# Patient Record
Sex: MALE | Race: WHITE | NOT HISPANIC OR LATINO | Employment: OTHER | URBAN - METROPOLITAN AREA
[De-identification: names, ages, dates, MRNs, and addresses within clinical notes are randomized per-mention and may not be internally consistent; named-entity substitution may affect disease eponyms.]

---

## 2018-05-03 ENCOUNTER — NEW PATIENT (OUTPATIENT)
Dept: URBAN - METROPOLITAN AREA CLINIC 39 | Facility: CLINIC | Age: 78
End: 2018-05-03

## 2018-05-03 DIAGNOSIS — D49.2: ICD-10-CM

## 2018-05-03 PROCEDURE — G8428 CUR MEDS NOT DOCUMENT: HCPCS

## 2018-05-03 PROCEDURE — 99202 OFFICE O/P NEW SF 15 MIN: CPT

## 2018-05-03 PROCEDURE — 4040F PNEUMOC VAC/ADMIN/RCVD: CPT

## 2018-05-03 PROCEDURE — 1036F TOBACCO NON-USER: CPT

## 2018-05-03 PROCEDURE — 67810 INCAL BX EYELID SKN LID MRGN: CPT

## 2018-05-03 PROCEDURE — G8484 FLU IMMUNIZE NO ADMIN: HCPCS

## 2018-05-03 ASSESSMENT — VISUAL ACUITY
OD_CC: 20/80
OS_CC: 20/60

## 2018-05-11 ENCOUNTER — CATARACT CONSULT (OUTPATIENT)
Dept: URBAN - METROPOLITAN AREA CLINIC 39 | Facility: CLINIC | Age: 78
End: 2018-05-11

## 2018-05-11 VITALS
DIASTOLIC BLOOD PRESSURE: 74 MMHG | HEIGHT: 60 IN | SYSTOLIC BLOOD PRESSURE: 126 MMHG | HEART RATE: 76 BPM | RESPIRATION RATE: 16 BRPM

## 2018-05-11 DIAGNOSIS — H18.51: ICD-10-CM

## 2018-05-11 DIAGNOSIS — Z79.899: ICD-10-CM

## 2018-05-11 DIAGNOSIS — H35.371: ICD-10-CM

## 2018-05-11 DIAGNOSIS — H25.812: ICD-10-CM

## 2018-05-11 DIAGNOSIS — H25.811: ICD-10-CM

## 2018-05-11 PROCEDURE — 92286 ANT SGM IMG I&R SPECLR MIC: CPT

## 2018-05-11 PROCEDURE — G9744 PT NOT ELI D/T ACT DIG HTN: HCPCS

## 2018-05-11 PROCEDURE — G8754 DIAS BP LESS 90: HCPCS

## 2018-05-11 PROCEDURE — G8752 SYS BP LESS 140: HCPCS

## 2018-05-11 PROCEDURE — 92134 CPTRZ OPH DX IMG PST SGM RTA: CPT

## 2018-05-11 PROCEDURE — 92136TC INTERFEROMETRY - TECHNICAL COMPONENT

## 2018-05-11 PROCEDURE — 4040F PNEUMOC VAC/ADMIN/RCVD: CPT

## 2018-05-11 PROCEDURE — 99215 OFFICE O/P EST HI 40 MIN: CPT

## 2018-05-11 PROCEDURE — 92025-1 CORNEAL TOPOGRAPHY, INS

## 2018-05-11 PROCEDURE — G8427 DOCREV CUR MEDS BY ELIG CLIN: HCPCS

## 2018-05-11 PROCEDURE — 1036F TOBACCO NON-USER: CPT

## 2018-05-11 RX ORDER — MOXIFLOXACIN HYDROCHLORIDE 5 MG/ML: 1 SOLUTION/ DROPS OPHTHALMIC

## 2018-05-11 RX ORDER — PREDNISOLONE ACETATE 10 MG/ML: 1 SUSPENSION/ DROPS OPHTHALMIC

## 2018-05-11 RX ORDER — NEPAFENAC 3 MG/ML: 1 SUSPENSION/ DROPS OPHTHALMIC ONCE A DAY

## 2018-05-11 ASSESSMENT — TONOMETRY
OD_IOP_MMHG: 13
OS_IOP_MMHG: 13

## 2018-05-11 ASSESSMENT — VISUAL ACUITY
OD_CC: J6
OS_CC: J3
OS_BAT: 20/70
OD_PAM: 20/60-1
OS_AM: 20/25+2
OD_SC: <J12
OS_SC: J12
OS_CC: 20/30-2
OD_SC: 20/400
OD_BAT: <20/400
OD_CC: 20/70
OS_SC: 20/40+1

## 2018-05-15 ENCOUNTER — CONSULT (OUTPATIENT)
Dept: URBAN - METROPOLITAN AREA CLINIC 39 | Facility: CLINIC | Age: 78
End: 2018-05-15

## 2018-05-15 VITALS — HEIGHT: 60 IN | SYSTOLIC BLOOD PRESSURE: 130 MMHG | DIASTOLIC BLOOD PRESSURE: 79 MMHG | HEART RATE: 70 BPM

## 2018-05-15 DIAGNOSIS — Z79.899: ICD-10-CM

## 2018-05-15 DIAGNOSIS — H43.811: ICD-10-CM

## 2018-05-15 DIAGNOSIS — H35.371: ICD-10-CM

## 2018-05-15 DIAGNOSIS — H43.812: ICD-10-CM

## 2018-05-15 PROCEDURE — G8752 SYS BP LESS 140: HCPCS

## 2018-05-15 PROCEDURE — 1036F TOBACCO NON-USER: CPT

## 2018-05-15 PROCEDURE — 92014 COMPRE OPH EXAM EST PT 1/>: CPT

## 2018-05-15 PROCEDURE — G8754 DIAS BP LESS 90: HCPCS

## 2018-05-15 PROCEDURE — 9222550 BILAT EXTENDED OPHTHALMOSCOPY, FIRST

## 2018-05-15 PROCEDURE — 92134 CPTRZ OPH DX IMG PST SGM RTA: CPT

## 2018-05-15 PROCEDURE — G8427 DOCREV CUR MEDS BY ELIG CLIN: HCPCS

## 2018-05-15 ASSESSMENT — TONOMETRY
OD_IOP_MMHG: 15
OS_IOP_MMHG: 17

## 2018-05-15 ASSESSMENT — VISUAL ACUITY
OS_CC: J3
OS_CC: 20/40-1
OD_CC: J8
OD_CC: 20/50-2

## 2018-05-21 ENCOUNTER — SURGERY/PROCEDURE (OUTPATIENT)
Dept: URBAN - METROPOLITAN AREA CLINIC 39 | Facility: CLINIC | Age: 78
End: 2018-05-21

## 2018-05-21 ENCOUNTER — TECH ONLY (OUTPATIENT)
Dept: URBAN - METROPOLITAN AREA CLINIC 39 | Facility: CLINIC | Age: 78
End: 2018-05-21

## 2018-05-21 DIAGNOSIS — Z96.1: ICD-10-CM

## 2018-05-21 DIAGNOSIS — H21.81: ICD-10-CM

## 2018-05-21 DIAGNOSIS — H25.812: ICD-10-CM

## 2018-05-21 PROCEDURE — G9744 PT NOT ELI D/T ACT DIG HTN: HCPCS

## 2018-05-21 PROCEDURE — 99211T TECH SERVICE

## 2018-05-21 PROCEDURE — G8756 NO BP MEASURE DOC: HCPCS

## 2018-05-21 PROCEDURE — G8428 CUR MEDS NOT DOCUMENT: HCPCS

## 2018-05-21 PROCEDURE — 4040F PNEUMOC VAC/ADMIN/RCVD: CPT

## 2018-05-21 PROCEDURE — 1036F TOBACCO NON-USER: CPT

## 2018-05-21 PROCEDURE — 6698254 COMPLEX CATARACT (SX ONLY)

## 2018-05-21 ASSESSMENT — TONOMETRY: OS_IOP_MMHG: 12

## 2018-05-21 ASSESSMENT — VISUAL ACUITY: OD_SC: 20/70

## 2018-06-04 ENCOUNTER — TECH ONLY (OUTPATIENT)
Dept: URBAN - METROPOLITAN AREA CLINIC 39 | Facility: CLINIC | Age: 78
End: 2018-06-04

## 2018-06-04 ENCOUNTER — POST OP/EVAL OF SECOND EYE (OUTPATIENT)
Dept: URBAN - METROPOLITAN AREA CLINIC 39 | Facility: CLINIC | Age: 78
End: 2018-06-04

## 2018-06-04 ENCOUNTER — SURGERY/PROCEDURE (OUTPATIENT)
Dept: URBAN - METROPOLITAN AREA CLINIC 39 | Facility: CLINIC | Age: 78
End: 2018-06-04

## 2018-06-04 DIAGNOSIS — Z96.1: ICD-10-CM

## 2018-06-04 DIAGNOSIS — H25.811: ICD-10-CM

## 2018-06-04 PROCEDURE — G8756 NO BP MEASURE DOC: HCPCS

## 2018-06-04 PROCEDURE — 99211T TECH SERVICE

## 2018-06-04 PROCEDURE — 1036F TOBACCO NON-USER: CPT

## 2018-06-04 PROCEDURE — G8428 CUR MEDS NOT DOCUMENT: HCPCS

## 2018-06-04 PROCEDURE — G9744 PT NOT ELI D/T ACT DIG HTN: HCPCS

## 2018-06-04 PROCEDURE — 4040F PNEUMOC VAC/ADMIN/RCVD: CPT

## 2018-06-04 PROCEDURE — 99213 OFFICE O/P EST LOW 20 MIN: CPT

## 2018-06-04 PROCEDURE — 6698454 REMOVE CATARACT;INSERT LENS (SX ONLY)

## 2018-06-04 ASSESSMENT — VISUAL ACUITY
OD_PAM: 20/60
OS_SC: J6
OD_SC: 20/400
OD_SC: <J12
OD_BAT: <20/400
OS_SC: 20/30
OD_SC: 20/100

## 2018-06-04 ASSESSMENT — TONOMETRY
OD_IOP_MMHG: 13
OS_IOP_MMHG: 13
OD_IOP_MMHG: 19
OS_IOP_MMHG: 16

## 2018-07-06 ENCOUNTER — ESTABLISHED PATIENT (OUTPATIENT)
Dept: URBAN - METROPOLITAN AREA CLINIC 39 | Facility: CLINIC | Age: 78
End: 2018-07-06

## 2018-07-06 DIAGNOSIS — H35.371: ICD-10-CM

## 2018-07-06 DIAGNOSIS — H43.813: ICD-10-CM

## 2018-07-06 DIAGNOSIS — D31.32: ICD-10-CM

## 2018-07-06 PROCEDURE — G8756 NO BP MEASURE DOC: HCPCS

## 2018-07-06 PROCEDURE — G8427 DOCREV CUR MEDS BY ELIG CLIN: HCPCS

## 2018-07-06 PROCEDURE — 1036F TOBACCO NON-USER: CPT

## 2018-07-06 PROCEDURE — 92250 FUNDUS PHOTOGRAPHY W/I&R: CPT

## 2018-07-06 PROCEDURE — 9222650 BILAT EXTENDED OPHTHALMOSCOPY, F/U

## 2018-07-06 PROCEDURE — 92134 CPTRZ OPH DX IMG PST SGM RTA: CPT

## 2018-07-06 PROCEDURE — 92014 COMPRE OPH EXAM EST PT 1/>: CPT

## 2018-07-06 ASSESSMENT — VISUAL ACUITY
OD_PH: 20/60-2
OS_CC: 20/40+2
OD_CC: 20/70-2

## 2018-07-06 ASSESSMENT — TONOMETRY
OS_IOP_MMHG: 10
OD_IOP_MMHG: 10

## 2019-10-15 ENCOUNTER — ESTABLISHED COMPREHENSIVE EXAM (OUTPATIENT)
Dept: URBAN - METROPOLITAN AREA CLINIC 39 | Facility: CLINIC | Age: 79
End: 2019-10-15

## 2019-10-15 DIAGNOSIS — H35.371: ICD-10-CM

## 2019-10-15 DIAGNOSIS — D31.32: ICD-10-CM

## 2019-10-15 DIAGNOSIS — H43.813: ICD-10-CM

## 2019-10-15 PROCEDURE — 92014 COMPRE OPH EXAM EST PT 1/>: CPT

## 2019-10-15 PROCEDURE — 92134 CPTRZ OPH DX IMG PST SGM RTA: CPT

## 2019-10-15 PROCEDURE — 9222650 BILAT EXTENDED OPHTHALMOSCOPY, F/U

## 2019-10-15 ASSESSMENT — VISUAL ACUITY
OS_CC: 20/30-2
OD_CC: 20/40-2

## 2019-10-15 ASSESSMENT — TONOMETRY
OD_IOP_MMHG: 10
OS_IOP_MMHG: 12

## 2020-06-04 ENCOUNTER — ESTABLISHED COMPREHENSIVE EXAM (OUTPATIENT)
Dept: URBAN - METROPOLITAN AREA CLINIC 42 | Facility: CLINIC | Age: 80
End: 2020-06-04

## 2020-06-04 DIAGNOSIS — Z01.00: ICD-10-CM

## 2020-06-04 DIAGNOSIS — H35.371: ICD-10-CM

## 2020-06-04 PROCEDURE — 92014 COMPRE OPH EXAM EST PT 1/>: CPT

## 2020-06-04 PROCEDURE — 92134 CPTRZ OPH DX IMG PST SGM RTA: CPT

## 2020-06-04 PROCEDURE — 92015 DETERMINE REFRACTIVE STATE: CPT

## 2020-06-04 ASSESSMENT — TONOMETRY
OS_IOP_MMHG: 17
OD_IOP_MMHG: 13

## 2020-06-04 ASSESSMENT — VISUAL ACUITY
OD_SC: 20/50
OS_SC: 20/40
OU_SC: 20/30-2
OU_SC: 20/60
OD_SC: 20/70
OS_SC: 20/60

## 2020-06-04 ASSESSMENT — KERATOMETRY
OD_K1POWER_DIOPTERS: 40.75
OD_AXISANGLE_DEGREES: 90
OD_K2POWER_DIOPTERS: 43
OD_AXISANGLE2_DEGREES: 180

## 2020-10-27 ENCOUNTER — ESTABLISHED COMPREHENSIVE EXAM (OUTPATIENT)
Dept: URBAN - METROPOLITAN AREA CLINIC 39 | Facility: CLINIC | Age: 80
End: 2020-10-27

## 2020-10-27 DIAGNOSIS — H35.371: ICD-10-CM

## 2020-10-27 DIAGNOSIS — D31.32: ICD-10-CM

## 2020-10-27 DIAGNOSIS — H43.813: ICD-10-CM

## 2020-10-27 PROCEDURE — 92014 COMPRE OPH EXAM EST PT 1/>: CPT

## 2020-10-27 PROCEDURE — 92202 OPSCPY EXTND ON/MAC DRAW: CPT

## 2020-10-27 PROCEDURE — 92134 CPTRZ OPH DX IMG PST SGM RTA: CPT

## 2020-10-27 ASSESSMENT — KERATOMETRY
OD_K2POWER_DIOPTERS: 43
OD_K1POWER_DIOPTERS: 40.75
OD_AXISANGLE2_DEGREES: 180
OD_AXISANGLE_DEGREES: 90

## 2020-10-27 ASSESSMENT — TONOMETRY
OS_IOP_MMHG: 16
OD_IOP_MMHG: 14

## 2020-10-27 ASSESSMENT — VISUAL ACUITY
OS_CC: 20/30+2
OD_CC: 20/40

## 2021-06-07 ENCOUNTER — ESTABLISHED COMPREHENSIVE EXAM (OUTPATIENT)
Dept: URBAN - METROPOLITAN AREA CLINIC 42 | Facility: CLINIC | Age: 81
End: 2021-06-07

## 2021-06-07 DIAGNOSIS — Z01.00: ICD-10-CM

## 2021-06-07 DIAGNOSIS — H53.001: ICD-10-CM

## 2021-06-07 DIAGNOSIS — H43.813: ICD-10-CM

## 2021-06-07 DIAGNOSIS — D31.32: ICD-10-CM

## 2021-06-07 DIAGNOSIS — Z96.1: ICD-10-CM

## 2021-06-07 DIAGNOSIS — H35.371: ICD-10-CM

## 2021-06-07 PROCEDURE — 92014 COMPRE OPH EXAM EST PT 1/>: CPT

## 2021-06-07 PROCEDURE — 92015 DETERMINE REFRACTIVE STATE: CPT

## 2021-06-07 PROCEDURE — 92134 CPTRZ OPH DX IMG PST SGM RTA: CPT

## 2021-06-07 ASSESSMENT — KERATOMETRY
OD_K1POWER_DIOPTERS: 40.75
OD_AXISANGLE2_DEGREES: 178
OD_AXISANGLE_DEGREES: 90
OS_AXISANGLE_DEGREES: 104
OD_AXISANGLE_DEGREES: 88
OS_K2POWER_DIOPTERS: 43.50
OS_K1POWER_DIOPTERS: 42.50
OD_K2POWER_DIOPTERS: 43
OD_K1POWER_DIOPTERS: 41.75
OD_AXISANGLE2_DEGREES: 180
OS_AXISANGLE2_DEGREES: 14
OD_K2POWER_DIOPTERS: 43.50

## 2021-06-07 ASSESSMENT — TONOMETRY
OD_IOP_MMHG: 9
OS_IOP_MMHG: 11

## 2021-06-07 ASSESSMENT — VISUAL ACUITY
OU_SC: 20/40
OS_CC: 20/20
OS_SC: 20/70
OU_CC: 20/20
OD_SC: 20/70-1
OD_CC: 20/40-2
OD_SC: 20/100
OU_SC: 20/40-2
OU_CC: 20/20-2
OS_SC: 20/50
OS_CC: 20/25-1
OD_CC: 20/40-2

## 2021-07-22 NOTE — PATIENT DISCUSSION
Discussed AREDS 2 supplements, UV protection and green leafy vegetables. Quality 111:Pneumonia Vaccination Status For Older Adults: Pneumococcal Vaccination Previously Received Detail Level: Detailed Quality 130: Documentation Of Current Medications In The Medical Record: Current Medications Documented Quality 431: Preventive Care And Screening: Unhealthy Alcohol Use - Screening: Patient screened for unhealthy alcohol use using a single question and scores less than 2 times per year Quality 226: Preventive Care And Screening: Tobacco Use: Screening And Cessation Intervention: Patient screened for tobacco use and is an ex/non-smoker Quality 47: Advance Care Plan: Advance Care Planning discussed and documented; advance care plan or surrogate decision maker documented in the medical record.

## 2021-09-29 NOTE — PATIENT DISCUSSION
How Severe Is It?: mild Recommended observation. Is This A New Presentation, Or A Follow-Up?: Follow Up Hand Dermatitis